# Patient Record
Sex: FEMALE | Race: WHITE | Employment: FULL TIME | URBAN - METROPOLITAN AREA
[De-identification: names, ages, dates, MRNs, and addresses within clinical notes are randomized per-mention and may not be internally consistent; named-entity substitution may affect disease eponyms.]

---

## 2017-10-27 ENCOUNTER — HOSPITAL ENCOUNTER (OUTPATIENT)
Dept: PHYSICAL THERAPY | Age: 42
Discharge: HOME | End: 2017-10-27
Payer: COMMERCIAL

## 2017-10-27 PROCEDURE — 97110 THERAPEUTIC EXERCISES: CPT

## 2017-10-27 PROCEDURE — 97162 PT EVAL MOD COMPLEX 30 MIN: CPT

## 2017-10-27 NOTE — PROGRESS NOTES
[address] [facility] AT [address], [address] [place] [address] Road  Phone: [phone]   [phone]  PLAN OF CARE / [address] [place] [address] PHYSICAL THERAPY SERVICES  Patient Name: Whitney Han : 1975   Medical   Diagnosis: Peroneal tendonitis, right [M76.71] Treatment Diagnosis: Peroneal tendonitis, right [M76.71]   Onset Date: 6 months ago     Referral Source: Jeana Gomes MD [facility]): 10/27/2017   Prior Hospitalization: See medical history Provider #: [id number]   Prior Level of Function: I with ADL's, pain with walking   Comorbidities: Asthma   Medications: Verified on Patient Summary List   The Plan of Care and following information is based on the information from the initial evaluation.   ===========================================================================================  Assessment / key information:  Pt is 36 y.o. female presenting with Peroneal tendonitis, right [M76.71]. Pt presents with pain located on the 5th metatarsal and lateral aspect of the R foot, described as burning pain. Pt presents wearing a boot on the R foot and reports MD recommended patient wear the boot at all times. Pt has changed her footwear as well as decreased standing and walking. Pain ranges 3-8/10. Previous testing and imaging has included: xrays revealing no fractures. Pt reports several years ago falling down the stairs and spraining the R lateral ankle. Pt denies any numbness or tingling in the affected limb. Pt presents with the following functional limitations: decreased standing and walking, decreased stair negotiation. Pt amb without Cam boot with increased BL pronation and  Decreased heel strike BL. Increased tightness of the BL gastrocs, BL peroneals, and BL HS. TTP over the lateral R 5th metatarsal and lateral malleoli. Decreased AROM of the R ankle in all directions with pain into PF and EV.  Decreased SL balance on the R. Sensation intact to light touch in the R LE. The patient was instructed in a home exercise program to address the above findings/deficits. Pt will benefit from PT interventions to address the aforementioned deficits and allow pt to return to PLOF.    ===========================================================================================  History MEDIUM  Complexity : 1-2 comorbidities / personal factors will impact the outcome/ POC ; Examination HIGH Complexity : 4+ Standardized tests and measures addressing body structure, function, activity limitation and / or participation in recreation ; Presentation MEDIUM Complexity : Evolving with changing characteristics ; Decision Making MEDIUM Complexity : FOTO score of 26-74; Complexity MEDIUM  Problem List: pain affecting function, decrease ROM, decrease strength, edema affecting function, impaired gait/ balance, decrease ADL/ functional abilitiies, decrease activity tolerance, decrease flexibility/ joint mobility and decrease transfer abilities   Treatment Plan may include any combination of the following: Therapeutic exercise, Therapeutic activities, Neuromuscular re-education, Physical agent/modality, Gait/balance training, Manual therapy, Patient education, Functional mobility training and Stair training  Patient / Family readiness to learn indicated by: asking questions, trying to perform skills and interest  Persons(s) to be included in education: patient (P)  Barriers to Learning/Limitations: None  Measures taken:    Patient Goal (s): Decrease pain, get out of the boot and back to normal walking   Patient self reported health status: fair  Rehabilitation Potential: good   Short Term Goals: To be accomplished in  1-2  weeks: Independent/compliant with long term HEP for ROM, flexibility and strength  Improve active dorsiflexion by 5 degrees to improve/normalize gait cycle     Long Term Goals: To be accomplished in  8-12  treatments:  1.  Improve FOTO outcome score by 5-10% to indicate a significant improvement in ADL function  2. Pt will verbalize 75% overall improvement in functional ADL's in order to progress towards PLOF. 3. Pt will be able to improve AROM of the R ankle to WNL in all directions in order to improve functional ADL's and housework duties. 4. Patient will be independent with long term HEP in order to prepare for DC to home. Frequency / Duration:   Patient to be seen  2  times per week for 12  treatments:  Patient / Caregiver education and instruction: exercises  G-Codes (GP): MASON  Therapist Signature: Gurpreet Swann, PT Date: 30/79/5707   Certification Period: NA Time: 7:49 AM   ===========================================================================================  I certify that the above Physical Therapy Services are being furnished while the patient is under my care. I agree with the treatment plan and certify that this therapy is necessary. Physician Signature:        Date:       Time:     Please sign and return to [facility] or you may fax the signed copy to [phone]. Thank you.

## 2017-10-27 NOTE — PROGRESS NOTES
[facility]     Patient Name: Cathie Jordan  Date:10/27/2017  : 1975  [x]  Patient  Verified  Payor: Wenceslao Dia / Plan: Robert Lei / Product Type: HMO /    In time:800  Out time:840  Total Treatment Time (min): 40  Visit #: 1 of 12    Treatment Area: Peroneal tendonitis, right [M76.71]    SUBJECTIVE  Pain Level (0-10 scale): (C):  (B):  (W):    Any medication changes, allergies to medications, diagnosis change, or new procedure performed?: see summary sheet for update  Subjective functional status/changes:   [] No changes reported  Chief complaint: Pt is 36 y.o. female presenting with Peroneal tendonitis, right [M76.71]. Pt presents with pain located on the 5th metatarsal and lateral aspect of the R foot, described as burning pain. Pt presents wearing a boot on the R foot and reports MD recommended patient wear the boot at all times. Pt has changed her footwear as well as decreased standing and walking. Pain ranges 3-8/10. Previous testing and imaging has included: xrays revealing no fractures. Pt reports several years ago falling down the stairs and spraining the R lateral ankle. Pt denies any numbness or tingling in the affected limb. Pt presents with the following functional limitations: decreased standing and walking, decreased stair negotiation.      Physical Therapy Evaluation  - Foot and Ankle    Gait: [] Normal    [] Abnormal    [x] Antalgic    [] NWB    Device:   Describe: cam boot on the R    ROM/Strength  [] Unable to assess at this time      AROM        PROM            Strength (1-5)   Left Right Left Right Left  Right   Dorsiflexion  0    4   Plantarflexion  15     4 p   Inversion  30    4   Eversion  15 p    4 p   Great Toe Ext         Great Toe Flex           Flexibility: [] Unable to assess at this time  Gastroc:    (L) Tightness [] WNL   [] Min   [x] Mod   [] Severe    (R) Tightness [] WNL   [] Min   [x] Mod   [] Severe  Soleus:    (L) Tightness [] WNL   [] Min [x] Mod   [] Severe    (R) Tightness [] WNL   [] Min   [x] Mod   [] Severe  Other:      (L) Tightness [] WNL   [] Min   [] Mod   [] Severe    (R) Tightness [] WNL   [] Min   [] Mod   [] Severe    Palpation:   Location:5th met styloid process, R lateral malleoli  Patient's Pain Response: [] Min   [x] Mod   [] Severe    Optional Tests:  Balance/Stork Test: touches/60sec (L):10 (R):10 p       Sub-talor alignment: [] Neutral     [x] Pronation      [] Supination    Forefoot alignment:  [] Neutral     [] Varus            [] Valgus    Anterior Drawer: [x] Neg    [] Pos  Posterior Drawer:  [x] Neg    [] Pos  Inversion Stress:  [x] Neg    [] Pos  Talar Tilt:   [x] Neg    [] Pos  Eversion Stress:  [] Neg    [] Pos  Laura's Sign:  [] Neg    [] Pos  Bolivar Test: [] Neg    [] Pos    Other tests/ comments:    OBJECTIVE  Modality (rationale): NA  []  E-Stim: type _ x _ min     []att   []unatt   []w/US   []w/ice   []w/heat  []  Traction: []cerv   []pelvic   _ lbs x _ min     []pro   []sup   []int   []const  []  Ultrasound: []cont   []pulse    _ W/cm2 x _  min   []1MHz   []3MHz  []  Iontophoresis: []take home patch w/ dexamethazone    []40mA   []80mA                               []_ mA min w/: []dexamethazone   []other:_  []  Ice pack _  min     [] Hot pack _  min     [] Paraffin _  min  []  Other:     Patient Education: [x] Established HEP    [] POT   (minutes) : 15    Pain Level (0-10 scale) post treatment: 8    ASSESSMENT  [x]  See Plan of Care    Evaluation Code Complexity: History MEDIUM  Complexity : 1-2 comorbidities / personal factors will impact the outcome/ POC ; Examination HIGH Complexity : 4+ Standardized tests and measures addressing body structure, function, activity limitation and / or participation in recreation ; Presentation MEDIUM Complexity : Evolving with changing characteristics ; Decision Making MEDIUM Complexity : FOTO score of 26-74;  Complexity MEDIUM    Justification for Eval Code Complexity:  Patient History : History of back pain  Examination SEE ABOVE EXAM  Clinical Presentation: evolving pain over the last 6 months  Clinical Decision Making : KPNL 72      PLAN  [x]  Upgrade activities as tolerated     []  Continue plan of care  []  Discharge due to:_  [x] Other:_  POC 2 session per week for 12 sessions.     David Swann, PT 10/27/2017  7:49 AM

## 2017-10-31 ENCOUNTER — HOSPITAL ENCOUNTER (OUTPATIENT)
Dept: PHYSICAL THERAPY | Age: 42
Discharge: HOME | End: 2017-10-31
Payer: COMMERCIAL

## 2017-10-31 PROCEDURE — 97140 MANUAL THERAPY 1/> REGIONS: CPT

## 2017-10-31 PROCEDURE — 97110 THERAPEUTIC EXERCISES: CPT

## 2017-10-31 NOTE — PROGRESS NOTES
PT DAILY TREATMENT NOTE     Patient Name: Louis Oneal  Date:10/31/2017  : 1975  [x]  Patient  Verified  Payor: Ritu Mukherjee / Plan: Myles Marc / Product Type: HMO /    In time:8:35  Out time:9:28  Total Treatment Time (min): 53  Total Timed Codes (min): 47  1:1 Treatment Time (min):    Visit #: 2 of     Treatment Area: Peroneal tendonitis, right [M76.71]    SUBJECTIVE  Pain Level (0-10 scale): 0  Any medication changes, allergies to medications, adverse drug reactions, diagnosis change, or new procedure performed?: [x] No    [] Yes (see summary sheet for update)  Subjective functional status/changes:   [] No changes reported  I don't really have much pain unless I twist my foot either way especially when I am standing, then it shoots up to a 10/10, but it also hurts if I walk too much. OBJECTIVE      43 min Therapeutic Exercise:  [x] See flow sheet :   Rationale: increase ROM and increase strength to improve the patients ability to improve functional abilities    10 min Manual Therapy:  STM/tissue mobs to lateral ankle and distal peroneals   Rationale: increase ROM, increase tissue extensibility and decrease trigger points to improve functional mobility           min Patient Education: [x] Review HEP    [] Progressed/Changed HEP based on:   [] positioning   [] body mechanics   [] transfers   [] heat/ice application        Other Objective/Functional Measures:     Pain Level (0-10 scale) post treatment: 0    ASSESSMENT/Changes in Function: Pt tolerated all new therex well upon trial today with chief c/o increased lateral ankle and peroneal pain with inversion and eversion AROM as well as prolonged ambulation. Pt presented with moderate tissue tenderness/edema in region with manual intervention and needs work on ankle and intrinsic foot mobility and strengthening. Will continue to progress/advance patient within current POC as tolerated with monitoring symptoms.      Patient will continue to benefit from skilled PT services to modify and progress therapeutic interventions, address functional mobility deficits, address ROM deficits, address strength deficits, analyze and address soft tissue restrictions and analyze and cue movement patterns to attain remaining goals.      []  See Plan of Care  []  See progress note/recertification  []  See Discharge Summary         Progress towards goals / Updated goals:      PLAN  [x]  Upgrade activities as tolerated     []  Continue plan of care  []  Update interventions per flow sheet       []  Discharge due to:_  []  Other:_      Rosie Hurt PTA 10/31/2017  8:48 AM

## 2017-11-02 ENCOUNTER — APPOINTMENT (OUTPATIENT)
Dept: PHYSICAL THERAPY | Age: 42
End: 2017-11-02
Payer: COMMERCIAL

## 2017-11-03 ENCOUNTER — HOSPITAL ENCOUNTER (OUTPATIENT)
Dept: PHYSICAL THERAPY | Age: 42
Discharge: HOME | End: 2017-11-03
Payer: COMMERCIAL

## 2017-11-03 PROCEDURE — 97140 MANUAL THERAPY 1/> REGIONS: CPT

## 2017-11-03 PROCEDURE — 97110 THERAPEUTIC EXERCISES: CPT

## 2017-11-03 NOTE — PROGRESS NOTES
PT DAILY TREATMENT NOTE     Patient Name: Lobito Nguyễn  Date:11/3/2017  : 1975  [x]  Patient  Verified  Payor: Dawson Guerra / Plan: Rhett Whitfield / Product Type: HMO /    In time:8:32  Out time:9:32  Total Treatment Time (min): 60  Total Timed Codes (min): 50  1:1 Treatment Time (min):    Visit #: 3 of     Treatment Area: Peroneal tendonitis, right [M76.71]    SUBJECTIVE  Pain Level (0-10 scale): 0  Any medication changes, allergies to medications, adverse drug reactions, diagnosis change, or new procedure performed?: [x] No    [] Yes (see summary sheet for update)  Subjective functional status/changes:   [] No changes reported  Pt reports no soreness so far, but she is in the boot almost all of the time.     OBJECTIVE  Modality rationale: decrease inflammation and decrease pain to improve the patients ability to perform standing and walking ADL's   Min Type Additional Details    [] Estim: []Att   []Unatt        []TENS instruct                  []IFC  []Premod   []NMES                     []Other:  []w/US   []w/ice   []w/heat  Position:  Location:    []  Traction: [] Cervical       []Lumbar                       [] Prone          []Supine                       []Intermittent   []Continuous Lbs:  [] before manual  [] after manual    []  Ultrasound: []Continuous   [] Pulsed                           []1MHz   []3MHz Location:  W/cm2:    []  Iontophoresis with dexamethasone         Location: [] Take home patch   [] In clinic   10 [x]  Ice     []  heat  []  Ice massage Position:  Location: R ankle    []  Vasopneumatic Device Pressure:       [] lo [] med [] hi   Temperature: [] lo [] med [] hi   [] Skin assessment post-treatment:  []intact []redness- no adverse reaction       []redness - adverse reaction:       40 min Therapeutic Exercise:  [] See flow sheet :   Rationale: increase ROM and increase strength to improve the patients ability to perform standing and walking ADL's    10 min Manual Therapy:  STM to post peroneal and lateral soleus region   Rationale: increase tissue extensibility to improve pain free ankle mobility and gait    Pain Level (0-10 scale) post treatment: 0    ASSESSMENT/Changes in Function: Pt's program was progressed today to include more standing activities to challenge med/lat stability and activation of the peroneal and intrinsic foot muscles. Patient will continue to benefit from skilled PT services to modify and progress therapeutic interventions, address functional mobility deficits, address strength deficits and analyze and address soft tissue restrictions to attain remaining goals.      []  See Plan of Care  []  See progress note/recertification  []  See Discharge Summary         PLAN  []  Upgrade activities as tolerated     [x]  Continue plan of care  []  Update interventions per flow sheet       []  Discharge due to:_  []  Other:_      Marlin Banuelos PT 11/3/2017  7:57 AM

## 2017-11-07 ENCOUNTER — HOSPITAL ENCOUNTER (OUTPATIENT)
Dept: PHYSICAL THERAPY | Age: 42
Discharge: HOME | End: 2017-11-07
Payer: COMMERCIAL

## 2017-11-07 PROCEDURE — 97140 MANUAL THERAPY 1/> REGIONS: CPT

## 2017-11-07 PROCEDURE — 97110 THERAPEUTIC EXERCISES: CPT

## 2017-11-07 NOTE — PROGRESS NOTES
PT DAILY TREATMENT NOTE     Patient Name: Brock Alston  Date:2017  : 1975  [x]  Patient  Verified  Payor: Darryle Blander / Plan: Yunior Salazar / Product Type: HMO /    In time:7:25  Out time:8:30  Total Treatment Time (min): 65  Total Timed Codes (min): 49  1:1 Treatment Time (min):   Visit #: 4 of     Treatment Area: Peroneal tendonitis, right [M76.71]    SUBJECTIVE  Pain Level (0-10 scale): 7/10  Any medication changes, allergies to medications, adverse drug reactions, diagnosis change, or new procedure performed?: [x] No    [] Yes (see summary sheet for update)  Subjective functional status/changes:   [] No changes reported  I don't think that the boot does me any good because I can't tell a difference between wearing a regular shoe and the boot, but I can't walk barefooted around the house because it kills the outside of my foot.     OBJECTIVE  Modality rationale: decrease edema, decrease inflammation and decrease pain to improve the patients ability to improve functional abilities   Min Type Additional Details    [] Estim: []Att   []Unatt        []TENS instruct                  []IFC  []Premod   []NMES                     []Other:  []w/US   []w/ice   []w/heat  Position:  Location:    []  Traction: [] Cervical       []Lumbar                       [] Prone          []Supine                       []Intermittent   []Continuous Lbs:  [] before manual  [] after manual    []  Ultrasound: []Continuous   [] Pulsed                           []1MHz   []3MHz Location:  W/cm2:    []  Iontophoresis with dexamethasone         Location: [] Take home patch   [] In clinic   10 [x]  Ice     []  heat  []  Ice massage Position:long sitting  Location:R ankle    []  Vasopneumatic Device Pressure:       [] lo [] med [] hi   Temperature: [] lo [] med [] hi   [] Skin assessment post-treatment:  []intact []redness- no adverse reaction       []redness - adverse reaction:       45 min Therapeutic Exercise:  [x] See flow sheet :   Rationale: increase ROM, increase strength, improve balance and increase proprioception to improve the patients ability to improve functional abilities    10 min Manual Therapy:  STM/tissue mobs to lateral ankle and distal peroneals   Rationale: decrease pain, increase ROM and increase tissue extensibility to improve functional mobility            min Patient Education: [x] Review HEP    [] Progressed/Changed HEP based on:   [] positioning   [] body mechanics   [] transfers   [] heat/ice application        Other Objective/Functional Measures:     Pain Level (0-10 scale) post treatment: 8/10    ASSESSMENT/Changes in Function: Pt was able to advance to performing calf stretching on slant board as well as advancing from sitting to standing with heel/toe raises with min to mod challenge today. Pt continues with c/o lateral ankle/foot pain with prolonged standing ADL's. Will continue to progress/advance patient within current POC as tolerated with monitoring symptoms. Patient will continue to benefit from skilled PT services to modify and progress therapeutic interventions, address functional mobility deficits, address ROM deficits, address strength deficits, analyze and address soft tissue restrictions and analyze and cue movement patterns to attain remaining goals.      []  See Plan of Care  []  See progress note/recertification  []  See Discharge Summary         Progress towards goals / Updated goals:      PLAN  [x]  Upgrade activities as tolerated     []  Continue plan of care  []  Update interventions per flow sheet       []  Discharge due to:_  []  Other:_      Shine Borrego, SOFI 11/7/2017  7:22 AM

## 2017-11-10 ENCOUNTER — HOSPITAL ENCOUNTER (OUTPATIENT)
Dept: PHYSICAL THERAPY | Age: 42
Discharge: HOME | End: 2017-11-10
Payer: COMMERCIAL

## 2017-11-10 PROCEDURE — 97140 MANUAL THERAPY 1/> REGIONS: CPT

## 2017-11-10 PROCEDURE — 97110 THERAPEUTIC EXERCISES: CPT

## 2017-11-10 NOTE — PROGRESS NOTES
PT DAILY TREATMENT NOTE     Patient Name: Bernardino Solomon  Date:11/10/2017  : 1975  [x]  Patient  Verified  Payor: Shayna Glass / Plan: Claire Garvey / Product Type: HMO /    In time:700  Out time:800  Total Treatment Time (min): 60  Visit #: 5 of     Treatment Area: Peroneal tendonitis, right [M76.71]    SUBJECTIVE  Pain Level (0-10 scale): 8  Any medication changes, allergies to medications, adverse drug reactions, diagnosis change, or new procedure performed?: [x] No    [] Yes (see summary sheet for update)  Subjective functional status/changes:   [] No changes reported  \"I had an event last night that I had to stand and walk a lot\"    OBJECTIVE  Modality rationale: decrease edema, decrease inflammation and decrease pain to improve the patients ability to improve functional abilities   Min Type Additional Details    [] Estim: []Att   []Unatt        []TENS instruct                  []IFC  []Premod   []NMES                     []Other:  []w/US   []w/ice   []w/heat  Position:  Location:    []  Traction: [] Cervical       []Lumbar                       [] Prone          []Supine                       []Intermittent   []Continuous Lbs:  [] before manual  [] after manual    []  Ultrasound: []Continuous   [] Pulsed                           []1MHz   []3MHz Location:  W/cm2:    []  Iontophoresis with dexamethasone         Location: [] Take home patch   [] In clinic   10 [x]  Ice     []  heat  []  Ice massage Position:long sitting  Location:R ankle    []  Vasopneumatic Device Pressure:       [] lo [] med [] hi   Temperature: [] lo [] med [] hi   [] Skin assessment post-treatment:  []intact []redness- no adverse reaction       []redness - adverse reaction:       40 min Therapeutic Exercise:  [x] See flow sheet :   Rationale: increase ROM, increase strength, improve balance and increase proprioception to improve the patients ability to improve functional abilities    10 min Manual Therapy:  DTM to lateral gastroc and peroneals in supine   Rationale: decrease pain, increase ROM and increase tissue extensibility to improve functional mobility            min Patient Education: [x] Review HEP    [] Progressed/Changed HEP based on:   [] positioning   [] body mechanics   [] transfers   [] heat/ice application        Other Objective/Functional Measures:     Pain Level (0-10 scale) post treatment: 3    ASSESSMENT/Changes in Function: Pt reports increased pain over the last week due to increased standing and walking without the boot on. Pt reports mild soreness without the boot while non-weight bearing, however increases to 8/10 with weight bearing. Pt was able to increase in full bowl with marbles. Moderate increase in lateral lower leg and gastroc tightness today, decreased with manual toady. Will continue to progress therex within current POC as patient is able. Patient will continue to benefit from skilled PT services to modify and progress therapeutic interventions, address functional mobility deficits, address ROM deficits, address strength deficits, analyze and address soft tissue restrictions and analyze and cue movement patterns to attain remaining goals.      []  See Plan of Care  []  See progress note/recertification  []  See Discharge Summary         Progress towards goals / Updated goals:      PLAN  [x]  Upgrade activities as tolerated     []  Continue plan of care  []  Update interventions per flow sheet       []  Discharge due to:_  []  Other:_      James Swann, PT 11/10/2017

## 2017-11-14 ENCOUNTER — HOSPITAL ENCOUNTER (OUTPATIENT)
Dept: PHYSICAL THERAPY | Age: 42
Discharge: HOME | End: 2017-11-14
Payer: COMMERCIAL

## 2017-11-14 PROCEDURE — 97110 THERAPEUTIC EXERCISES: CPT

## 2017-11-14 PROCEDURE — 97140 MANUAL THERAPY 1/> REGIONS: CPT

## 2017-11-14 NOTE — PROGRESS NOTES
[facility] AT [address], [address] [place] [address] Road  Phone: [phone]   [phone]  PROGRESS NOTE  Patient Name: Allyssa Buchanan : 1975   Treatment/Medical Diagnosis: Peroneal tendonitis, right [M76.71]   Referral Source: Tabitha Cheney MD     Date of Initial Visit: 10/27/17 Attended Visits: 6 Missed Visits: 0     SUMMARY OF TREATMENT  Therapeutic exercise for ankle/foot mobility, ankle/intrinsic foot strengthening and stability, balance/proprioceptive awareness, manual intervention, cryotherapy and HEP. CURRENT STATUS  Patient reports approximately 50% overall improvement from therapy since initial evaluation with 5/10 pain level on average, increased to 8/10 at the worst with prolonged standing/walking >2 hours. Pt has advanced to standing ankle strengthening, singe leg stance and balance/proprioceptive awareness activities over the past 2-3 visits, but is still transitioning between wearing cam boot and normal footwear. Will continue to progress/advance patient within current POC as tolerated with monitoring symptoms. R ankle AROM= PF=50 degrees; DF=2 degrees; INV=35 degrees; EVR=10 degrees  Goal/Measure of Progress Goal Met? 1. Improve active dorsiflexion by 5 degrees to improve/normalize gait cycle   Status at last Eval: R ankle DF AROM= 0 degrees Current Status: R ankle DF AROM= 2 degrees no   2. Improve FOTO outcome score by 5-10% to indicate a significant improvement in ADL function   Status at last Eval: 51/100 Current Status: 50/100 no   3. Pt will verbalize 75% overall improvement in functional ADL's in order to progress towards PLOF. Status at last Eval: Progressing Current Status: Not Met, 50% overall improvement reported no   4. Pt will be able to improve AROM of the R ankle to WNL in all directions in order to improve functional ADL's and housework duties.    Status at last Eval: Progressing Current Status: Partially Met, some directions improved no     New Goals to be achieved in __6__  treatments:  1. Improve active dorsiflexion by 5 degrees to improve/normalize gait cycle   2. Improve FOTO outcome score by 5-10% to indicate a significant improvement in ADL function   3. Pt will verbalize 75% overall improvement in functional ADL's in order to progress towards PLOF. 4.  Pt will be able to improve AROM of the R ankle to WNL in all directions in order to improve functional ADL's and housework duties. RECOMMENDATIONS  Continue with current POC for 6 remaining visits left on current script with advancing as tolerated, then reassess for the need for continuation or discharge from therapy. If you have any questions/comments please contact us directly at [phone]. Thank you for allowing us to assist in the care of your patient. LPTA Signature: Emily Holcomb PTA  Date: 11/14/2017   PT Signature: Maci Swann PT Time: 7:11 AM   NOTE TO PHYSICIAN:  PLEASE COMPLETE THE ORDERS BELOW AND FAX TO   [facility] at [facility] UNIT: [phone]. If you are unable to process this request in 24 hours please contact our office: [phone].    ___ I have read the above report and request that my patient continue as recommended.   ___ I have read the above report and request that my patient continue therapy with the following changes/special instructions:_________________________________________________________   ___ I have read the above report and request that my patient be discharged from therapy.      Physician Signature:        Date:       Time:

## 2017-11-14 NOTE — PROGRESS NOTES
PT DAILY TREATMENT NOTE     Patient Name: Brock Alston  Date:2017  : 1975  [x]  Patient  Verified  Payor: Darryle Blander / Plan: Yunior Salazar / Product Type: HMO /    In time:7:02  Out time:8:10  Total Treatment Time (min): 68  Total Timed Codes (min): 62  1:1 Treatment Time (min):    Visit #: 6 of     Treatment Area: Peroneal tendonitis, right [M76.71]    SUBJECTIVE  Pain Level (0-10 scale): 10  Any medication changes, allergies to medications, adverse drug reactions, diagnosis change, or new procedure performed?: [x] No    [] Yes (see summary sheet for update)  Subjective functional status/changes:   [] No changes reported  My ankle was moderately sore after doing a lot of walking when I was doing some marcie over the weekend, but I have been trying to go without the boot more lately to test if it is really getting better without it. OBJECTIVE      58 min Therapeutic Exercise:  [x] See flow sheet :   Rationale: increase ROM, increase strength, improve balance and increase proprioception to improve the patients ability to improve functional abilities    10 min Manual Therapy:    DTM to lateral gastroc and peroneals in prone   Rationale: decrease pain, increase ROM and increase tissue extensibility to improve functional mobility           min Patient Education: [x] Review HEP    [] Progressed/Changed HEP based on:   [] positioning   [] body mechanics   [] transfers   [] heat/ice application        Other Objective/Functional Measures:     Pain Level (0-10 scale) post treatment: 4/10    ASSESSMENT/Changes in Function:     Patient will continue to benefit from skilled PT services to modify and progress therapeutic interventions, address functional mobility deficits, address ROM deficits, address strength deficits, analyze and address soft tissue restrictions and analyze and cue movement patterns to attain remaining goals.      []  See Plan of Care  [x]  See progress note/recertification  []  See Discharge Summary         Progress towards goals / Updated goals:  See Progress note/Physician update for full detailed progress towards established goals.     PLAN  [x]  Upgrade activities as tolerated     []  Continue plan of care  []  Update interventions per flow sheet       []  Discharge due to:_  []  Other:_      Emily Holcomb, SOFI 11/14/2017  7:04 AM

## 2017-11-16 ENCOUNTER — HOSPITAL ENCOUNTER (OUTPATIENT)
Dept: PHYSICAL THERAPY | Age: 42
Discharge: HOME | End: 2017-11-16
Payer: COMMERCIAL

## 2017-11-16 PROCEDURE — 97110 THERAPEUTIC EXERCISES: CPT

## 2017-11-16 PROCEDURE — 97140 MANUAL THERAPY 1/> REGIONS: CPT

## 2017-11-16 NOTE — PROGRESS NOTES
PT DAILY TREATMENT NOTE     Patient Name: Shayna Sanchez  Date:2017  : 1975  [x]  Patient  Verified  Payor: Pablo Segundo / Plan: Elfego Nab / Product Type: HMO /    In time:7:02  Out time:8:12  Total Treatment Time (min): 70  Total Timed Codes (min): 64  1:1 Treatment Time (min):    Visit #: 7 of     Treatment Area: Peroneal tendonitis, right [M76.71]    SUBJECTIVE  Pain Level (0-10 scale): 0  Any medication changes, allergies to medications, adverse drug reactions, diagnosis change, or new procedure performed?: [x] No    [] Yes (see summary sheet for update)  Subjective functional status/changes:   [] No changes reported  My foot and ankle has been feeling suprisingly good since I was here last and I have been doing the new band that you gave me along with the new stretch that you showed me. OBJECTIVE      60 min Therapeutic Exercise:  [x] See flow sheet :   Rationale: increase ROM, increase strength, improve balance and increase proprioception to improve the patients ability to improve functional abilities    10 min Manual Therapy:  DTM to lateral gastroc and peroneals in prone   Rationale: increase ROM and increase tissue extensibility to improve functional mobility           min Patient Education: [x] Review HEP    [] Progressed/Changed HEP based on:   [] positioning   [] body mechanics   [] transfers   [] heat/ice application        Other Objective/Functional Measures:     Pain Level (0-10 scale) post treatment: -3/10    ASSESSMENT/Changes in Function: Pt presents with noticeably decreased intensity of symptoms since last tx. Pt was also able to advance to standing on foam with tandem stance as well as addition of SLS and STAR taps on foam with min to mod challenge with proprioceptive/balance awareness today. Will continue to progress/advance patient within current POC as tolerated with monitoring symptoms.     Patient will continue to benefit from skilled PT services to modify and progress therapeutic interventions, address functional mobility deficits, address ROM deficits, address strength deficits, analyze and address soft tissue restrictions and analyze and cue movement patterns to attain remaining goals.      []  See Plan of Care  []  See progress note/recertification  []  See Discharge Summary         Progress towards goals / Updated goals:      PLAN  [x]  Upgrade activities as tolerated     []  Continue plan of care  []  Update interventions per flow sheet       []  Discharge due to:_  []  Other:_      Susan Pena, PTA 11/16/2017  7:11 AM

## 2017-11-21 ENCOUNTER — APPOINTMENT (OUTPATIENT)
Dept: PHYSICAL THERAPY | Age: 42
End: 2017-11-21
Payer: COMMERCIAL

## 2017-11-28 ENCOUNTER — APPOINTMENT (OUTPATIENT)
Dept: PHYSICAL THERAPY | Age: 42
End: 2017-11-28
Payer: COMMERCIAL

## 2017-12-01 ENCOUNTER — APPOINTMENT (OUTPATIENT)
Dept: PHYSICAL THERAPY | Age: 42
End: 2017-12-01

## 2017-12-05 ENCOUNTER — APPOINTMENT (OUTPATIENT)
Dept: PHYSICAL THERAPY | Age: 42
End: 2017-12-05

## 2017-12-07 ENCOUNTER — APPOINTMENT (OUTPATIENT)
Dept: PHYSICAL THERAPY | Age: 42
End: 2017-12-07

## 2017-12-12 ENCOUNTER — APPOINTMENT (OUTPATIENT)
Dept: PHYSICAL THERAPY | Age: 42
End: 2017-12-12

## 2017-12-14 ENCOUNTER — APPOINTMENT (OUTPATIENT)
Dept: PHYSICAL THERAPY | Age: 42
End: 2017-12-14

## 2017-12-15 NOTE — PROGRESS NOTES
[facility] AT [address], [address] [place] [address]  Phone: [phone]   [phone]  DISCHARGE SUMMARY  Patient Name: Tony Crawford : 1975   Treatment/Medical Diagnosis: Peroneal tendonitis, right [M76.71]   Referral Source: Fidelina Davis MD     Date of Initial Visit: 10/27/17 Attended Visits: 7 Missed Visits: 0     SUMMARY OF TREATMENT  Therapeutic exercise for ankle/foot mobility, ankle/intrinsic foot strengthening and stability, balance/proprioceptive awareness, manual intervention, cryotherapy and HEP. CURRENT STATUS  Patient was seen for 7 visits since her IE on 10/27/17. Patient reported 50% overall improvement following her reassessment on 17. Patient called and requested self DC from PT due to \"undergoing another form of therapy\". Patient will be discharged at this time. RECOMMENDATIONS  Other: Patient requests self DC from PT. If you have any questions/comments please contact us directly at [phone]. Thank you for allowing us to assist in the care of your patient.     Therapist Signature: Diaz Montez PT Date: 12/15/2017   Reporting Period: NA Time: 9:47 AM

## 2017-12-19 ENCOUNTER — APPOINTMENT (OUTPATIENT)
Dept: PHYSICAL THERAPY | Age: 42
End: 2017-12-19